# Patient Record
Sex: MALE | Race: WHITE | Employment: OTHER | URBAN - METROPOLITAN AREA
[De-identification: names, ages, dates, MRNs, and addresses within clinical notes are randomized per-mention and may not be internally consistent; named-entity substitution may affect disease eponyms.]

---

## 2022-10-25 ENCOUNTER — HOSPITAL ENCOUNTER (EMERGENCY)
Age: 87
Discharge: HOME | End: 2022-10-25
Attending: EMERGENCY MEDICINE | Admitting: EMERGENCY MEDICINE
Payer: MEDICARE

## 2022-10-25 ENCOUNTER — APPOINTMENT (OUTPATIENT)
Dept: CT IMAGING | Age: 87
End: 2022-10-25
Attending: EMERGENCY MEDICINE
Payer: MEDICARE

## 2022-10-25 VITALS
TEMPERATURE: 97.5 F | HEART RATE: 63 BPM | DIASTOLIC BLOOD PRESSURE: 73 MMHG | HEIGHT: 77 IN | OXYGEN SATURATION: 98 % | SYSTOLIC BLOOD PRESSURE: 136 MMHG | RESPIRATION RATE: 16 BRPM | BODY MASS INDEX: 21.5 KG/M2 | WEIGHT: 182.1 LBS

## 2022-10-25 DIAGNOSIS — S09.90XA CLOSED HEAD INJURY, INITIAL ENCOUNTER: ICD-10-CM
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
## 2022-10-25 DIAGNOSIS — S01.01XA LACERATION OF SCALP, INITIAL ENCOUNTER: ICD-10-CM

## 2022-10-25 PROCEDURE — 75810000293 HC SIMP/SUPERF WND  RPR: Performed by: EMERGENCY MEDICINE

## 2022-10-25 PROCEDURE — 99284 EMERGENCY DEPT VISIT MOD MDM: CPT | Performed by: EMERGENCY MEDICINE

## 2022-10-25 PROCEDURE — 70450 CT HEAD/BRAIN W/O DYE: CPT

## 2022-10-25 RX ORDER — ENALAPRIL MALEATE 5 MG/1
TABLET ORAL DAILY
COMMUNITY

## 2022-10-25 RX ORDER — LEVOTHYROXINE SODIUM 175 UG/1
175 TABLET ORAL
COMMUNITY

## 2022-10-25 RX ORDER — CLOPIDOGREL BISULFATE 75 MG/1
TABLET ORAL
COMMUNITY

## 2022-10-25 RX ORDER — BACITRACIN 500 UNIT/G
1 PACKET (EA) TOPICAL
Status: DISCONTINUED | OUTPATIENT
Start: 2022-10-25 | End: 2022-10-25 | Stop reason: HOSPADM

## 2022-10-25 RX ORDER — GLIPIZIDE 10 MG/1
10 TABLET ORAL 2 TIMES DAILY
COMMUNITY

## 2022-10-25 RX ORDER — PRAVASTATIN SODIUM 40 MG/1
40 TABLET ORAL
COMMUNITY

## 2022-10-25 NOTE — ED NOTES
Discussed wound care, suture removal, indications to return. Pt and family member verbalized understanding.

## 2022-10-25 NOTE — ED PROVIDER NOTES
The history is provided by the patient and the spouse. Fall  The accident occurred 1 to 2 hours ago. The fall occurred while walking. He fell from a height of ground level. He landed on Hard floor. The volume of blood lost was minimal. The point of impact was the head. The pain is present in the head. The pain is mild. He was Ambulatory at the scene. There was No entrapment after the fall. There was No drug use involved in the accident. There was No alcohol use involved in the accident. Associated symptoms include headaches and laceration. Pertinent negatives include no visual change, no fever, no numbness, no abdominal pain, no bowel incontinence, no nausea, no vomiting, no hematuria, no extremity weakness, no hearing loss, no loss of consciousness and no tingling. The risk factors include being elderly. The symptoms are aggravated by pressure on injury. He has tried nothing for the symptoms. The treatment provided no relief. The patient's last tetanus shot was less than 5 years ago. Past Medical History:   Diagnosis Date    Diabetes [facility])     H/O thyroidectomy     [facility]        History reviewed. No pertinent surgical history. History reviewed. No pertinent family history.     Social History     Socioeconomic History    Marital status: Not on file     Spouse name: Not on file    Number of children: Not on file    Years of education: Not on file    Highest education level: Not on file   Occupational History    Not on file   Tobacco Use    Smoking status: Former     Types: Cigarettes    Smokeless tobacco: Never   Substance and Sexual Activity    Alcohol use: Yes     Comment: \"on weekends\"    Drug use: Never    Sexual activity: Not on file   Other Topics Concern    Not on file   Social History Narrative    Not on file     Social Determinants of Health     Financial Resource Strain: Not on file   Food Insecurity: Not on file   Transportation Needs: Not on file   Physical Activity: Not on file Stress: Not on file   Social Connections: Not on file   Intimate Partner Violence: Not on file   Housing Stability: Not on file         ALLERGIES: Aspirin and Pcn [penicillins]    Review of Systems   Constitutional:  Negative for activity change, chills and fever. HENT:  Negative for nosebleeds, sore throat, trouble swallowing and voice change. Eyes:  Negative for visual disturbance. Respiratory:  Negative for shortness of breath. Cardiovascular:  Negative for chest pain and palpitations. Gastrointestinal:  Negative for abdominal pain, bowel incontinence, constipation, diarrhea, nausea and vomiting. Genitourinary:  Negative for difficulty urinating, dysuria, hematuria and urgency. Musculoskeletal:  Negative for back pain, extremity weakness, neck pain and neck stiffness. Skin:  Positive for wound. Negative for color change. Allergic/Immunologic: Negative for immunocompromised state. Neurological:  Positive for headaches. Negative for dizziness, tingling, seizures, loss of consciousness, syncope, weakness, light-headedness and numbness. Psychiatric/Behavioral:  Negative for behavioral problems, confusion, hallucinations, self-injury and suicidal ideas. Vitals:    10/25/22 1326   BP: 136/73   Pulse: 63   Resp: 16   Temp: 97.5 °F (36.4 °C)   SpO2: 98%   Weight: 82.6 kg (182 lb 1.6 oz)   Height: 6' 5\" (1.956 m)            Physical Exam  Vitals and nursing note reviewed. Constitutional:       General: He is not in acute distress. Appearance: He is well-developed. He is not diaphoretic. HENT:      Head:     Neck:      Trachea: No tracheal deviation. Cardiovascular:      Comments: Warm and well perfused  Pulmonary:      Effort: Pulmonary effort is normal. No respiratory distress. Musculoskeletal:         General: Normal range of motion. Skin:     General: Skin is warm and dry. Findings: Laceration present. Neurological:      Mental Status: He is alert.       Coordination: Coordination normal.   Psychiatric:         Behavior: Behavior normal.         Thought Content: Thought content normal.         Judgment: Judgment normal.        MDM    This is an 55-year-old male with past medical history, review of systems, physical exam as above, presenting with complaints of fall, laceration. Patient states he was leaning down to  a ball off the ground, when he tammy up he fell backward, landing on his bottom, continuing backward and striking his head on the ground. Patient denies loss of consciousness. He endorses use of clopidogrel. He states his tetanus was updated less than 5 years ago, denies discomfort with the exception of mild headache. Physical exam is remarkable for well-appearing elderly male, in no acute distress noted to have approximately 5 cm laceration left of the occiput, hemostatic at the time of evaluation, no cervical spine tenderness, equal and reactive pupils, extraocular movements intact, free range of motion upper and lower extremities without pain. Discussed with patient concern for intracranial injury, will obtain CT imaging, provide wound care and plan for closure with staples, bacitracin. Patient is refusing pain control at this time. We will reassess, and make a disposition. WOUND REPAIR    Date/Time: 10/25/2022 2:45 PM  Performed by: attendingPreparation: skin prepped with Shur-Clens and sterile field established  Pre-procedure re-eval: Immediately prior to the procedure, the patient was reevaluated and found suitable for the planned procedure and any planned medications.   Location details: scalp  Wound length:2.6 - 7.5 cm  Anesthesia: see MAR for details    Anesthesia:  Local Anesthetic: LET (lido, epi, tetracaine)  Foreign bodies: no foreign bodies  Irrigation method: jet lavage  Debridement: none  Skin closure: staples  Number of sutures: 11  Technique: simple  Dressing: antibiotic ointment  Patient tolerance: patient tolerated the procedure well with no immediate complications  My total time at bedside, performing this procedure was 1-15 minutes.

## 2022-10-25 NOTE — ED TRIAGE NOTES
ED triage note: ambulatory with a cane accompanied by wife. Patient reports was bending down to  a tennis ball and fell backwards and hit his head. Denies LOC. Patient takes Clopidogrel with last dose last night. States went to his clinic at [facility] and was told they did not have the correct bandages.

## 2023-05-17 RX ORDER — PRAVASTATIN SODIUM 40 MG
40 TABLET ORAL NIGHTLY
COMMUNITY

## 2023-05-17 RX ORDER — CLOPIDOGREL BISULFATE 75 MG/1
TABLET ORAL
COMMUNITY

## 2023-05-17 RX ORDER — GLIPIZIDE 10 MG/1
10 TABLET ORAL 2 TIMES DAILY
COMMUNITY

## 2023-05-17 RX ORDER — LEVOTHYROXINE SODIUM 175 UG/1
175 TABLET ORAL
COMMUNITY

## 2023-05-17 RX ORDER — ENALAPRIL MALEATE 5 MG/1
TABLET ORAL DAILY
COMMUNITY